# Patient Record
Sex: FEMALE | Race: WHITE | NOT HISPANIC OR LATINO | Employment: FULL TIME | URBAN - METROPOLITAN AREA
[De-identification: names, ages, dates, MRNs, and addresses within clinical notes are randomized per-mention and may not be internally consistent; named-entity substitution may affect disease eponyms.]

---

## 2021-03-08 RX ORDER — FLASH GLUCOSE SENSOR
KIT MISCELLANEOUS
Qty: 2 EACH | Refills: 0 | Status: SHIPPED | OUTPATIENT
Start: 2021-03-08 | End: 2021-04-08 | Stop reason: SDUPTHER

## 2021-04-08 RX ORDER — FLASH GLUCOSE SENSOR
KIT MISCELLANEOUS
Qty: 2 EACH | Refills: 1 | Status: SHIPPED | OUTPATIENT
Start: 2021-04-08 | End: 2021-05-21

## 2021-04-08 NOTE — TELEPHONE ENCOUNTER
PT CALLED REQUESTING A REFILL OF AA PartyE SENSORS TO BE SENT IN TO [facility] IN [place], [place]. PLEASE AND THANK YOU

## 2021-05-12 RX ORDER — INSULIN DEGLUDEC 200 U/ML
INJECTION, SOLUTION SUBCUTANEOUS
Qty: 18 ML | Refills: 0 | Status: SHIPPED | OUTPATIENT
Start: 2021-05-12 | End: 2021-06-04 | Stop reason: SDUPTHER

## 2021-05-21 ENCOUNTER — LAB (OUTPATIENT)
Dept: LAB | Facility: HOSPITAL | Age: 41
End: 2021-05-21

## 2021-05-21 ENCOUNTER — OFFICE VISIT (OUTPATIENT)
Dept: ENDOCRINOLOGY | Facility: CLINIC | Age: 41
End: 2021-05-21

## 2021-05-21 VITALS
OXYGEN SATURATION: 98 % | HEART RATE: 76 BPM | BODY MASS INDEX: 43.71 KG/M2 | WEIGHT: 256 LBS | HEIGHT: 64 IN | SYSTOLIC BLOOD PRESSURE: 118 MMHG | DIASTOLIC BLOOD PRESSURE: 84 MMHG

## 2021-05-21 DIAGNOSIS — E10.65 TYPE 1 DIABETES MELLITUS WITH HYPERGLYCEMIA (HCC): Primary | Chronic | ICD-10-CM

## 2021-05-21 DIAGNOSIS — E10.65 TYPE 1 DIABETES MELLITUS WITH HYPERGLYCEMIA (HCC): Chronic | ICD-10-CM

## 2021-05-21 LAB
EXPIRATION DATE: ABNORMAL
EXPIRATION DATE: NORMAL
GLUCOSE BLDC GLUCOMTR-MCNC: 162 MG/DL (ref 70–130)
HBA1C MFR BLD: 7 %
Lab: ABNORMAL
Lab: NORMAL

## 2021-05-21 PROCEDURE — 83036 HEMOGLOBIN GLYCOSYLATED A1C: CPT | Performed by: PHYSICIAN ASSISTANT

## 2021-05-21 PROCEDURE — 82570 ASSAY OF URINE CREATININE: CPT

## 2021-05-21 PROCEDURE — 99213 OFFICE O/P EST LOW 20 MIN: CPT | Performed by: PHYSICIAN ASSISTANT

## 2021-05-21 PROCEDURE — 95251 CONT GLUC MNTR ANALYSIS I&R: CPT | Performed by: PHYSICIAN ASSISTANT

## 2021-05-21 PROCEDURE — 82043 UR ALBUMIN QUANTITATIVE: CPT

## 2021-05-21 RX ORDER — LISINOPRIL AND HYDROCHLOROTHIAZIDE 12.5; 1 MG/1; MG/1
1 TABLET ORAL DAILY
COMMUNITY
End: 2022-03-16 | Stop reason: SDUPTHER

## 2021-05-21 RX ORDER — VILAZODONE HYDROCHLORIDE 20 MG/1
20 TABLET ORAL DAILY
COMMUNITY
End: 2022-03-15 | Stop reason: SDUPTHER

## 2021-05-21 RX ORDER — ATORVASTATIN CALCIUM 20 MG/1
20 TABLET, FILM COATED ORAL DAILY
COMMUNITY
End: 2022-03-16 | Stop reason: SDUPTHER

## 2021-05-21 NOTE — PROGRESS NOTES
"     Office Note      Date: 2021  Patient Name: Bridgett Barber  MRN: [id number]  : 1980    Chief Complaint   Patient presents with   • Diabetes       History of Present Illness:   Bridgett Barber is a 40 y.o. female who presents today for type 1 diabetes, diagnosed .  Her last visit was over a year ago.  She remains on basal/bolus insulin.  Currently taking 80 units of Tresiba.  She is taking Novolog based on carb counting.  She does add insulin if blood sugar is higher before eating, but she is not really using a correction factor.  She is using the Michelle CGM.  She reports seeing PCP in 2020 and had labs.  The A1c was over 9%.  She reports working on blood sugar control and diet after this.  She notes some hypoglycemia.  She denies any severe hypoglycemia.  She reports hands feel numb when she has woken up at night with hypoglycemia.  She denies any problems with her feet.  Eye exam up to date.  She remains on statin and ACE inhibitor.    Subjective      Review of Systems:   Review of Systems   Constitutional: Negative.    Cardiovascular: Negative.    Gastrointestinal: Negative.    Endocrine: Negative.        The following portions of the patient's history were reviewed and updated as appropriate: allergies, current medications, past family history, past medical history, past social history, past surgical history and problem list.    Objective     Vitals:    21 1037   BP: 118/84   BP Location: Left arm   Patient Position: Sitting   Cuff Size: Adult   Pulse: 76   SpO2: 98%   Weight: 116 kg (256 lb)   Height: 162.6 cm (64\")   PainSc: 0-No pain     Body mass index is 43.94 kg/m².    Physical Exam  Vitals reviewed.   Constitutional:       General: She is not in acute distress.  Cardiovascular:      Pulses:           Dorsalis pedis pulses are 2+ on the right side and 2+ on the left side.        Posterior tibial pulses are 2+ on the right side and 2+ on the left side.   Musculoskeletal:      Right foot: " No deformity.      Left foot: No deformity.   Feet:      Right foot:      Protective Sensation: 10 sites tested. 10 sites sensed.      Skin integrity: Skin integrity normal.      Toenail Condition: Right toenails are normal.      Left foot:      Protective Sensation: 10 sites tested. 10 sites sensed.      Skin integrity: Skin integrity normal.      Toenail Condition: Left toenails are normal.      Comments:   Neurological:      Mental Status: She is alert and oriented to person, place, and time.   Psychiatric:         Mood and Affect: Affect normal.         HEMOGLOBIN A1C  Lab Results   Component Value Date    HGBA1C 7.0 05/21/2021         Current Outpatient Medications   Medication Instructions   • atorvastatin (LIPITOR) 20 mg, Oral, Daily   • Continuous Blood Gluc  (FreeStyle Michelle 2 [place]) device 1 Device, Does not apply, Continuous, Use as directed for continuously monitoring glucose.   • Continuous Blood Gluc Sensor (FreeStyle Michelle 2 Sensor) misc 1 Device, Does not apply, Every 14 Days   • insulin aspart (novoLOG FLEXPEN) 100 UNIT/ML solution pen-injector sc pen Inject 1 unit per 3g carbs with meals, max 100 units daily   • Insulin Degludec (Tresiba FlexTouch) 200 UNIT/ML solution pen-injector pen injection Inject 96u sq q AM; titrate up to fasting FSBS <120; max daily dose 120u   • lisinopril-hydrochlorothiazide (PRINZIDE,ZESTORETIC) 10-12.5 MG per tablet 1 tablet, Oral, Daily   • vilazodone (VIIBRYD) 20 mg, Oral, Daily       Assessment / Plan      Assessment & Plan:  1. Type 1 diabetes mellitus with hyperglycemia (CMS/[facility])  A1c much better!  Good job.  Reviewed CGM data and discussed with patient.  Sensor glucose average 150 for the past 2 weeks, 68% time in target, 5% low.  Basal dose looks okay.  No patterns to make adjustments at this time.  Make sure to be scanning at least every 8 hours to avoid breaks in data.  Discussed changing to the Michelle 2 CGM for alerts - new Rx sent.  Discussed how she  is currently dosing for hyperglycemia.  Will use correction factor 1 unit per 10 over 150 - adjust from there if this is too aggressive.  - POC Glycosylated Hemoglobin (Hb A1C)  - POC Glucose, Blood  - Microalbumin / Creatinine Urine Ratio - Urine, Clean Catch; Future      Return in about 3 months (around 8/21/2021) for Next scheduled follow up.     EHSNA Lisa  Endocrinology  05/21/2021

## 2021-05-22 LAB
ALBUMIN UR-MCNC: <1.2 MG/DL
CREAT UR-MCNC: 110 MG/DL
MICROALBUMIN/CREAT UR: NORMAL MG/G{CREAT}

## 2021-05-25 ENCOUNTER — TELEPHONE (OUTPATIENT)
Dept: ENDOCRINOLOGY | Facility: CLINIC | Age: 41
End: 2021-05-25

## 2021-05-25 DIAGNOSIS — E10.65 TYPE 1 DIABETES MELLITUS WITH HYPERGLYCEMIA (HCC): Primary | ICD-10-CM

## 2021-05-25 NOTE — TELEPHONE ENCOUNTER
I received fax from her insurance.  Stated that the Freestyle Michelle CGM is not covered.  Her insurance prefers the Dexcom CGM.  Please ask patient if she wants to use the Dexcom?  Thank you.

## 2021-05-26 RX ORDER — PROCHLORPERAZINE 25 MG/1
1 SUPPOSITORY RECTAL
Qty: 1 EACH | Refills: 3 | Status: SHIPPED | OUTPATIENT
Start: 2021-05-26 | End: 2022-03-16 | Stop reason: SDUPTHER

## 2021-05-26 RX ORDER — PROCHLORPERAZINE 25 MG/1
1 SUPPOSITORY RECTAL CONTINUOUS
Qty: 1 EACH | Refills: 0 | Status: SHIPPED | OUTPATIENT
Start: 2021-05-26 | End: 2022-03-16

## 2021-05-26 RX ORDER — PROCHLORPERAZINE 25 MG/1
SUPPOSITORY RECTAL
Qty: 9 EACH | Refills: 3 | Status: SHIPPED | OUTPATIENT
Start: 2021-05-26 | End: 2022-03-16 | Stop reason: SDUPTHER

## 2021-05-26 NOTE — TELEPHONE ENCOUNTER
Tried to call patient and the number listed is disconnected.  Tried to call emergency contact and there was no answer.  Left message to return call.

## 2021-06-04 RX ORDER — INSULIN DEGLUDEC 200 U/ML
INJECTION, SOLUTION SUBCUTANEOUS
Qty: 54 ML | Refills: 1 | Status: SHIPPED | OUTPATIENT
Start: 2021-06-04 | End: 2022-03-16

## 2021-06-04 NOTE — TELEPHONE ENCOUNTER
Pt called needs refill on Tresiba flextouch 100 unit also Novolog flextouch 100 unit. Pt last seen 05/21/21 pt next appt 09/03/21

## 2021-10-18 ENCOUNTER — PATIENT MESSAGE (OUTPATIENT)
Dept: ENDOCRINOLOGY | Facility: CLINIC | Age: 41
End: 2021-10-18

## 2022-03-15 ENCOUNTER — LAB (OUTPATIENT)
Dept: LAB | Facility: HOSPITAL | Age: 42
End: 2022-03-15

## 2022-03-15 ENCOUNTER — OFFICE VISIT (OUTPATIENT)
Dept: ENDOCRINOLOGY | Facility: CLINIC | Age: 42
End: 2022-03-15

## 2022-03-15 VITALS
HEIGHT: 65 IN | OXYGEN SATURATION: 99 % | SYSTOLIC BLOOD PRESSURE: 116 MMHG | DIASTOLIC BLOOD PRESSURE: 70 MMHG | HEART RATE: 76 BPM | WEIGHT: 253 LBS | BODY MASS INDEX: 42.15 KG/M2

## 2022-03-15 DIAGNOSIS — E78.00 HYPERCHOLESTEROLEMIA: Chronic | ICD-10-CM

## 2022-03-15 DIAGNOSIS — I10 PRIMARY HYPERTENSION: Chronic | ICD-10-CM

## 2022-03-15 DIAGNOSIS — E78.00 HYPERCHOLESTEROLEMIA: ICD-10-CM

## 2022-03-15 DIAGNOSIS — E10.65 TYPE 1 DIABETES MELLITUS WITH HYPERGLYCEMIA: ICD-10-CM

## 2022-03-15 DIAGNOSIS — E10.65 TYPE 1 DIABETES MELLITUS WITH HYPERGLYCEMIA: Primary | Chronic | ICD-10-CM

## 2022-03-15 LAB
ALBUMIN SERPL-MCNC: 4.5 G/DL (ref 3.5–5.2)
ALBUMIN/GLOB SERPL: 1.7 G/DL
ALP SERPL-CCNC: 59 U/L (ref 39–117)
ALT SERPL W P-5'-P-CCNC: 25 U/L (ref 1–33)
ANION GAP SERPL CALCULATED.3IONS-SCNC: 10.9 MMOL/L (ref 5–15)
AST SERPL-CCNC: 15 U/L (ref 1–32)
BILIRUB SERPL-MCNC: 0.4 MG/DL (ref 0–1.2)
BUN SERPL-MCNC: 13 MG/DL (ref 6–20)
BUN/CREAT SERPL: 20 (ref 7–25)
CALCIUM SPEC-SCNC: 9.7 MG/DL (ref 8.6–10.5)
CHLORIDE SERPL-SCNC: 102 MMOL/L (ref 98–107)
CHOLEST SERPL-MCNC: 241 MG/DL (ref 0–200)
CO2 SERPL-SCNC: 26.1 MMOL/L (ref 22–29)
CREAT SERPL-MCNC: 0.65 MG/DL (ref 0.57–1)
DEPRECATED RDW RBC AUTO: 41.5 FL (ref 37–54)
EGFRCR SERPLBLD CKD-EPI 2021: 112.9 ML/MIN/1.73
ERYTHROCYTE [DISTWIDTH] IN BLOOD BY AUTOMATED COUNT: 12.2 % (ref 12.3–15.4)
EXPIRATION DATE: ABNORMAL
EXPIRATION DATE: NORMAL
GLOBULIN UR ELPH-MCNC: 2.7 GM/DL
GLUCOSE BLDC GLUCOMTR-MCNC: 194 MG/DL (ref 70–130)
GLUCOSE SERPL-MCNC: 193 MG/DL (ref 65–99)
HBA1C MFR BLD: 6.3 %
HCT VFR BLD AUTO: 42 % (ref 34–46.6)
HDLC SERPL-MCNC: 47 MG/DL (ref 40–60)
HGB BLD-MCNC: 14.1 G/DL (ref 12–15.9)
LDLC SERPL CALC-MCNC: 181 MG/DL (ref 0–100)
LDLC/HDLC SERPL: 3.8 {RATIO}
Lab: ABNORMAL
Lab: NORMAL
MCH RBC QN AUTO: 31.2 PG (ref 26.6–33)
MCHC RBC AUTO-ENTMCNC: 33.6 G/DL (ref 31.5–35.7)
MCV RBC AUTO: 92.9 FL (ref 79–97)
PLATELET # BLD AUTO: 237 10*3/MM3 (ref 140–450)
PMV BLD AUTO: 10 FL (ref 6–12)
POTASSIUM SERPL-SCNC: 4.5 MMOL/L (ref 3.5–5.2)
PROT SERPL-MCNC: 7.2 G/DL (ref 6–8.5)
RBC # BLD AUTO: 4.52 10*6/MM3 (ref 3.77–5.28)
SODIUM SERPL-SCNC: 139 MMOL/L (ref 136–145)
TRIGL SERPL-MCNC: 78 MG/DL (ref 0–150)
TSH SERPL DL<=0.05 MIU/L-ACNC: 3.31 UIU/ML (ref 0.27–4.2)
VLDLC SERPL-MCNC: 13 MG/DL (ref 5–40)
WBC NRBC COR # BLD: 8.44 10*3/MM3 (ref 3.4–10.8)

## 2022-03-15 PROCEDURE — 80061 LIPID PANEL: CPT

## 2022-03-15 PROCEDURE — 82043 UR ALBUMIN QUANTITATIVE: CPT

## 2022-03-15 PROCEDURE — 95251 CONT GLUC MNTR ANALYSIS I&R: CPT | Performed by: PHYSICIAN ASSISTANT

## 2022-03-15 PROCEDURE — 99214 OFFICE O/P EST MOD 30 MIN: CPT | Performed by: PHYSICIAN ASSISTANT

## 2022-03-15 PROCEDURE — 82570 ASSAY OF URINE CREATININE: CPT

## 2022-03-15 PROCEDURE — 80050 GENERAL HEALTH PANEL: CPT

## 2022-03-15 PROCEDURE — 83036 HEMOGLOBIN GLYCOSYLATED A1C: CPT | Performed by: PHYSICIAN ASSISTANT

## 2022-03-15 RX ORDER — VILAZODONE HYDROCHLORIDE 20 MG/1
20 TABLET ORAL DAILY
Qty: 30 TABLET | Refills: 0 | Status: SHIPPED | OUTPATIENT
Start: 2022-03-15 | End: 2022-11-28

## 2022-03-16 LAB
ALBUMIN UR-MCNC: <1.2 MG/DL
CREAT UR-MCNC: 133.9 MG/DL
MICROALBUMIN/CREAT UR: NORMAL MG/G{CREAT}

## 2022-03-16 RX ORDER — PROCHLORPERAZINE 25 MG/1
1 SUPPOSITORY RECTAL
Qty: 1 EACH | Refills: 3 | Status: SHIPPED | OUTPATIENT
Start: 2022-03-16 | End: 2023-04-04 | Stop reason: SDUPTHER

## 2022-03-16 RX ORDER — INSULIN DEGLUDEC 200 U/ML
INJECTION, SOLUTION SUBCUTANEOUS
Qty: 45 ML | Refills: 1 | Status: SHIPPED | OUTPATIENT
Start: 2022-03-16 | End: 2022-12-01

## 2022-03-16 RX ORDER — ATORVASTATIN CALCIUM 20 MG/1
20 TABLET, FILM COATED ORAL DAILY
Qty: 90 TABLET | Refills: 3 | Status: SHIPPED | OUTPATIENT
Start: 2022-03-16

## 2022-03-16 RX ORDER — PROCHLORPERAZINE 25 MG/1
SUPPOSITORY RECTAL
Qty: 9 EACH | Refills: 3 | Status: SHIPPED | OUTPATIENT
Start: 2022-03-16 | End: 2022-05-09

## 2022-03-16 RX ORDER — LISINOPRIL AND HYDROCHLOROTHIAZIDE 12.5; 1 MG/1; MG/1
1 TABLET ORAL DAILY
Qty: 90 TABLET | Refills: 3 | Status: SHIPPED | OUTPATIENT
Start: 2022-03-16 | End: 2022-11-28

## 2022-03-23 ENCOUNTER — PATIENT MESSAGE (OUTPATIENT)
Dept: ENDOCRINOLOGY | Facility: CLINIC | Age: 42
End: 2022-03-23

## 2022-05-09 DIAGNOSIS — E10.65 TYPE 1 DIABETES MELLITUS WITH HYPERGLYCEMIA: Chronic | ICD-10-CM

## 2022-05-09 RX ORDER — PROCHLORPERAZINE 25 MG/1
SUPPOSITORY RECTAL
Qty: 9 EACH | Refills: 3 | Status: SHIPPED | OUTPATIENT
Start: 2022-05-09 | End: 2023-04-04 | Stop reason: SDUPTHER

## 2022-05-27 DIAGNOSIS — E10.65 TYPE 1 DIABETES MELLITUS WITH HYPERGLYCEMIA: Chronic | ICD-10-CM

## 2022-05-27 RX ORDER — PROCHLORPERAZINE 25 MG/1
SUPPOSITORY RECTAL
Qty: 1 EACH | Refills: 3 | OUTPATIENT
Start: 2022-05-27

## 2022-05-27 NOTE — TELEPHONE ENCOUNTER
Duplicate request RX was sent in March with 3 refills  Not due yet   Continuous Blood Gluc Transmit (Dexcom G6 Transmitter) misc [[id number]]     Order Details  Dose: 1 Device Route: Does not apply Frequency: Every 3 Months   Dispense Quantity: 1 each Refills: 3          Si Device Every 3 (Three) Months.         Start Date: 22 End Date: --   Written Date: 22 Expiration Date: 23       Diagnosis Association: Type 1 diabetes mellitus with hyperglycemia (HCC) (E10.65)   Original Order:  Continuous Blood Gluc Transmit (Dexcom G6 Transmitter) misc [[id number]]     Providers    Ordering Provider and Authorizing Provider:    Sweta Gilmore PA   [address] [place] [address]   Phone:  [phone]   Fax:  [phone]   NPI:  [id number]        Ordering User:  Sweta Gilmore PA          Pharmacy    [facility] [address] AT [address] [facility] - [phone] [facility] [phone]    [id number]  [facility] [address]   Phone:  [phone]  Fax:  [phone]           Orders with any of the following pharmaceutical classes: Medical Devices    Name Dose Frequency Start Date End Date Medication Warnings Interventions? Order Mode    Continuous Blood Gluc Sensor (Dexcom G6 Sensor)   22    Outpatient                        Warnings History    No Interaction Warnings Shown              Pharmacist Clinical Review History    This prescription has not been clinically reviewed.       Order Reconciliation Actions       Order Reconciliation Actions                 E-Prescribing Status      Outpatient Medication Detail    Continuous Blood Gluc Transmit (Dexcom G6 Transmitter) misc        Si Device Every 3 (Three) Months.        Sent to pharmacy as: Dexcom G6 Transmitter        Class: Normal        Route: Does not apply        E-Prescribing Status: Receipt confirmed by pharmacy (3/16/2022  7:49 PM EDT)

## 2022-11-08 DIAGNOSIS — E10.65 TYPE 1 DIABETES MELLITUS WITH HYPERGLYCEMIA: Chronic | ICD-10-CM

## 2022-11-08 RX ORDER — INSULIN ASPART 100 [IU]/ML
INJECTION, SOLUTION INTRAVENOUS; SUBCUTANEOUS
Qty: 90 ML | Refills: 1 | OUTPATIENT
Start: 2022-11-08

## 2022-11-08 NOTE — TELEPHONE ENCOUNTER
Last office visit 03/15/2022.  No follow up scheduled.    Spoke with patient and appointment scheduled for 11/30/2022 at 1000am.  Patient states she does not need refill at this time.

## 2022-11-28 ENCOUNTER — OFFICE VISIT (OUTPATIENT)
Dept: ENDOCRINOLOGY | Facility: CLINIC | Age: 42
End: 2022-11-28
Payer: COMMERCIAL

## 2022-11-28 ENCOUNTER — LAB (OUTPATIENT)
Dept: LAB | Facility: HOSPITAL | Age: 42
End: 2022-11-28

## 2022-11-28 ENCOUNTER — TELEPHONE (OUTPATIENT)
Dept: ENDOCRINOLOGY | Facility: CLINIC | Age: 42
End: 2022-11-28

## 2022-11-28 VITALS
DIASTOLIC BLOOD PRESSURE: 72 MMHG | OXYGEN SATURATION: 98 % | HEIGHT: 65 IN | WEIGHT: 272 LBS | BODY MASS INDEX: 45.32 KG/M2 | HEART RATE: 78 BPM | SYSTOLIC BLOOD PRESSURE: 122 MMHG

## 2022-11-28 DIAGNOSIS — R60.9 EDEMA, UNSPECIFIED TYPE: ICD-10-CM

## 2022-11-28 DIAGNOSIS — E10.65 TYPE 1 DIABETES MELLITUS WITH HYPERGLYCEMIA: Primary | Chronic | ICD-10-CM

## 2022-11-28 DIAGNOSIS — E78.00 HYPERCHOLESTEROLEMIA: Chronic | ICD-10-CM

## 2022-11-28 DIAGNOSIS — E66.01 CLASS 3 SEVERE OBESITY DUE TO EXCESS CALORIES WITH BODY MASS INDEX (BMI) OF 40.0 TO 44.9 IN ADULT, UNSPECIFIED WHETHER SERIOUS COMORBIDITY PRESENT: Chronic | ICD-10-CM

## 2022-11-28 LAB
CREAT UR-MCNC: 128.4 MG/DL
EXPIRATION DATE: ABNORMAL
GLUCOSE BLDC GLUCOMTR-MCNC: 139 MG/DL (ref 70–130)
Lab: ABNORMAL
PROT ?TM UR-MCNC: 5.5 MG/DL
PROT/CREAT UR: 42.8 MG/G CREA (ref 0–200)

## 2022-11-28 PROCEDURE — 82570 ASSAY OF URINE CREATININE: CPT

## 2022-11-28 PROCEDURE — 95251 CONT GLUC MNTR ANALYSIS I&R: CPT | Performed by: PHYSICIAN ASSISTANT

## 2022-11-28 PROCEDURE — 99214 OFFICE O/P EST MOD 30 MIN: CPT | Performed by: PHYSICIAN ASSISTANT

## 2022-11-28 PROCEDURE — 84156 ASSAY OF PROTEIN URINE: CPT

## 2022-11-28 RX ORDER — SEMAGLUTIDE 1.34 MG/ML
INJECTION, SOLUTION SUBCUTANEOUS
Qty: 1.5 ML | Refills: 0 | Status: SHIPPED | OUTPATIENT
Start: 2022-11-28 | End: 2023-04-04 | Stop reason: ALTCHOICE

## 2022-11-28 NOTE — TELEPHONE ENCOUNTER
----- Message from EHSAN Hamilton sent at 11/28/2022 12:01 PM EST -----  Please call for copy of lab results with PCP from 11/17/2022 - Barbara CARNEY.  Thank you.

## 2022-12-01 DIAGNOSIS — E10.65 TYPE 1 DIABETES MELLITUS WITH HYPERGLYCEMIA: Chronic | ICD-10-CM

## 2022-12-01 RX ORDER — INSULIN DEGLUDEC 200 U/ML
INJECTION, SOLUTION SUBCUTANEOUS
Qty: 45 ML | Refills: 1 | Status: SHIPPED | OUTPATIENT
Start: 2022-12-01

## 2022-12-29 DIAGNOSIS — E10.65 TYPE 1 DIABETES MELLITUS WITH HYPERGLYCEMIA: Chronic | ICD-10-CM

## 2022-12-29 RX ORDER — INSULIN ASPART 100 [IU]/ML
INJECTION, SOLUTION INTRAVENOUS; SUBCUTANEOUS
Qty: 30 ML | Refills: 1 | Status: SHIPPED | OUTPATIENT
Start: 2022-12-29 | End: 2023-01-11 | Stop reason: SDUPTHER

## 2022-12-29 NOTE — TELEPHONE ENCOUNTER
PT CALLED REQUESTING NOVOLOG TO BE SENT IN TO [facility].    PT ALSO WOULD LIKE A TEMP RX TO BE SENT IN TO [facility] IN [place], [place].

## 2023-01-11 ENCOUNTER — PATIENT MESSAGE (OUTPATIENT)
Dept: ENDOCRINOLOGY | Facility: CLINIC | Age: 43
End: 2023-01-11
Payer: COMMERCIAL

## 2023-01-11 RX ORDER — INSULIN ASPART 100 [IU]/ML
INJECTION, SOLUTION INTRAVENOUS; SUBCUTANEOUS
Qty: 30 ML | Refills: 1 | Status: SHIPPED | OUTPATIENT
Start: 2023-01-11 | End: 2023-04-04 | Stop reason: ALTCHOICE

## 2023-01-11 RX ORDER — INSULIN ASPART 100 [IU]/ML
INJECTION, SOLUTION INTRAVENOUS; SUBCUTANEOUS
Qty: 30 ML | Refills: 1 | OUTPATIENT
Start: 2023-01-11

## 2023-01-11 NOTE — TELEPHONE ENCOUNTER
From: Bridgett Barber  To: Sweta Gilmore  Sent: 1/11/2023 3:35 PM EST  Subject: Novolog    I have a prescription for Novolog that will be shipped from [facility] next week. I picked up a one box supply from [facility] on the 12-29-22. I only have a little left from this box and I am afraid that it won't last until that shipment comes in.   I haven't been feeling well the past couple of weeks, I have had a cold and some stomach issues. My sugar has been running high and I am assuming it is due to me not feeling well, so I have been having to take more insulin than normal. I have never ran out of insulin before that I recall but I am worried that this might happen. So I didn't know if you could you fix it where I could get it refilled at [facility]?

## 2023-04-04 ENCOUNTER — OFFICE VISIT (OUTPATIENT)
Dept: ENDOCRINOLOGY | Facility: CLINIC | Age: 43
End: 2023-04-04
Payer: COMMERCIAL

## 2023-04-04 VITALS
HEART RATE: 78 BPM | WEIGHT: 265 LBS | BODY MASS INDEX: 44.15 KG/M2 | OXYGEN SATURATION: 99 % | HEIGHT: 65 IN | DIASTOLIC BLOOD PRESSURE: 82 MMHG | SYSTOLIC BLOOD PRESSURE: 120 MMHG

## 2023-04-04 DIAGNOSIS — E78.00 HYPERCHOLESTEROLEMIA: Chronic | ICD-10-CM

## 2023-04-04 DIAGNOSIS — E66.01 CLASS 3 SEVERE OBESITY DUE TO EXCESS CALORIES WITH BODY MASS INDEX (BMI) OF 40.0 TO 44.9 IN ADULT, UNSPECIFIED WHETHER SERIOUS COMORBIDITY PRESENT: Chronic | ICD-10-CM

## 2023-04-04 DIAGNOSIS — E10.65 TYPE 1 DIABETES MELLITUS WITH HYPERGLYCEMIA: Primary | Chronic | ICD-10-CM

## 2023-04-04 LAB
EXPIRATION DATE: ABNORMAL
EXPIRATION DATE: NORMAL
GLUCOSE BLDC GLUCOMTR-MCNC: 199 MG/DL (ref 70–130)
HBA1C MFR BLD: 5.7 %
Lab: ABNORMAL
Lab: NORMAL

## 2023-04-04 RX ORDER — LIRAGLUTIDE 6 MG/ML
INJECTION, SOLUTION SUBCUTANEOUS
COMMUNITY
Start: 2023-03-31

## 2023-04-04 RX ORDER — PROCHLORPERAZINE 25 MG/1
1 SUPPOSITORY RECTAL
Qty: 1 EACH | Refills: 3 | Status: SHIPPED | OUTPATIENT
Start: 2023-04-04

## 2023-04-04 RX ORDER — PROCHLORPERAZINE 25 MG/1
SUPPOSITORY RECTAL
Qty: 9 EACH | Refills: 3 | Status: SHIPPED | OUTPATIENT
Start: 2023-04-04

## 2023-04-04 NOTE — PROGRESS NOTES
Office Note      Date: 2023  Patient Name: Bridgett Barber  MRN: [id number]  : 1980    Chief Complaint   Patient presents with   • Diabetes       History of Present Illness  Bridgett Barber is a 42 y.o. female who presents today for follow up on type 1 diabetes.   Diabetes was diagnosed in .  Known diabetic complications: none.  She remains on basal/bolus insulin injections.  She is using the Dexcom G6 CGM.  She is monitoring glucose ~288 times per day using CGM.  She is frequently adjusting insulin based on glucose readings and carb counting.  She reports that Dexcom has often been higher than FSBG.  She has been calibrating as needed.  She reports higher BGs in the past couple of weeks.  She has noted some hypoglycemia.  She reports that this has occurred after pre-bolusing for meal and then not eating as much as planned.  No severe hypoglycemia.  Diet/exercise: She has cut back on portions.  Stationary bike at home, 15-30 minutes per day.  Feet: No issues.  Last eye exam: 2022 (may have been more recent - she will check on this). No retinopathy.  ACE inhibitor/ARB: Lisinopril was discontinued due to hypotension.  Statin: Atorvastatin.  Ozempic was denied since type 1 diabetes.  Wegovy was on backorder.  She reports being prescribed Saxenda by her PCP.  She has been on Saxenda for 2 months.  She reports tolerating well now after initial N/V/D.    Review of systems  As noted in HPI.    Past Medical History:   Diagnosis Date   • Hypercholesterolemia    • Hypertension    • Obesity    • Type 1 diabetes mellitus, uncontrolled       Past Surgical History:   Procedure Laterality Date   • CHOLECYSTECTOMY     • LAPAROSCOPIC TUBAL LIGATION     • LASER ABLATION OF THE CERVIX     • TONSILLECTOMY AND ADENOIDECTOMY       The following portions of the patient's history were reviewed and updated as appropriate: allergies, current medications, past family history, past medical history, past social history, past  "surgical history and problem list.    Vitals:  /82 (BP Location: Left arm, Patient Position: Sitting, Cuff Size: Adult)   Pulse 78   Ht 165.1 cm (65\")   Wt 120 kg (265 lb)   SpO2 99%   BMI 44.10 kg/m²     Physical Exam  Vitals reviewed.   Constitutional:       General: She is not in acute distress.  Neurological:      Mental Status: She is alert and oriented to person, place, and time.   Psychiatric:         Mood and Affect: Affect normal.         Labs/Imaging    Hemoglobin A1c  Lab Results   Component Value Date    HGBA1C 5.7 04/04/2023    HGBA1C 6.3 03/15/2022    HGBA1C 7.0 05/21/2021     Glucose  Lab Results   Component Value Date    POCGLU 199 (A) 04/04/2023     Urine microalbumin/creatinine ratio  Lab Results   Component Value Date    UTPCR 42.8 11/28/2022     Lab Results   Component Value Date    MALBCRERATIO  03/15/2022      Comment:      Unable to calculate       Current Outpatient Medications   Medication Instructions   • atorvastatin (LIPITOR) 20 mg, Oral, Daily   • Continuous Blood Gluc Sensor (Dexcom G6 Sensor) Change every 10 days.   • Continuous Blood Gluc Transmit (Dexcom G6 Transmitter) misc 1 Device, Does not apply, Every 3 Months   • cycloSPORINE, PF, 0.09 % solution Ophthalmic   • insulin aspart (novoLOG FLEXPEN) 100 UNIT/ML solution pen-injector sc pen Subcutaneous, 3 Times Daily With Meals, 1 unit per q 3 carbs   • Insulin Degludec (Tresiba FlexTouch) 200 UNIT/ML solution pen-injector pen injection INJECT 80 UNITS SUBCUTANEOUSLY EVERY MORNING; TITRATE UP TO FASTING FINGERSTICK BLOOD SUGAR LESS THAN 120; (MAXIMUM DAILY DOSE: 100 UNITS)   • Saxenda 18 MG/3ML injection pen No dose, route, or frequency recorded.       Assessment & Plan  1. Type 1 diabetes mellitus with hyperglycemia  A1c looks good.  Would expect higher A1c based on Dexcom CGM data.  Reviewed CGM data and discussed with patient.  Sensor glucose average 156 for the past 2 weeks, 71% time in range , 1% low 54-69, <1% " very low <54, 5% high >250.  Rising glucose/fasting hyperglycemia.  Some of this appears to be rebound from hypoglycemia.  Tresiba 80 units daily.  Novolog carb ratio 1:3, correction 1:15 over 130.  Discussed adjusting boluses up to 50% prior to physical activity/exercise.  She will have labs with PCP next week.  Check fructosamine due to discrepancy between A1c and CGM.  Lab order provided today.  - POC Glucose, Blood  - POC Glycosylated Hemoglobin (Hb A1C)  - Fructosamine; Future  - Continuous Blood Gluc Transmit (Dexcom G6 Transmitter) misc; 1 Device Every 3 (Three) Months.  Dispense: 1 each; Refill: 3  - Continuous Blood Gluc Sensor (Dexcom G6 Sensor); Change every 10 days.  Dispense: 9 each; Refill: 3    2. Hypercholesterolemia  Continue atorvastatin along with diet/exercise.    3. Class 3 severe obesity due to excess calories with body mass index (BMI) of 40.0 to 44.9 in adult, unspecified whether serious comorbidity present  Weight is down 7 pounds.  She will continue the Saxenda.  Encouraged nutritious dietary choices, moderation of carbohydrates, avoidance of added sugar, caloric restriction and regular exercise.       Return in about 3 months (around 7/4/2023) for next scheduled follow up. She was advised to contact the office with any interval questions or concerns.    EHSAN Lisa  Endocrinology  04/04/2023

## 2023-04-17 DIAGNOSIS — E10.65 TYPE 1 DIABETES MELLITUS WITH HYPERGLYCEMIA: Chronic | ICD-10-CM

## 2023-05-14 DIAGNOSIS — E10.65 TYPE 1 DIABETES MELLITUS WITH HYPERGLYCEMIA: Chronic | ICD-10-CM

## 2023-05-15 ENCOUNTER — PATIENT MESSAGE (OUTPATIENT)
Dept: ENDOCRINOLOGY | Facility: CLINIC | Age: 43
End: 2023-05-15
Payer: COMMERCIAL

## 2023-05-15 DIAGNOSIS — E10.65 TYPE 1 DIABETES MELLITUS WITH HYPERGLYCEMIA: Primary | ICD-10-CM

## 2023-05-16 RX ORDER — INSULIN DEGLUDEC 200 U/ML
INJECTION, SOLUTION SUBCUTANEOUS
Qty: 45 ML | Refills: 1 | Status: SHIPPED | OUTPATIENT
Start: 2023-05-16

## 2023-05-17 RX ORDER — BLOOD-GLUCOSE SENSOR
1 EACH MISCELLANEOUS
Qty: 9 EACH | Refills: 3 | Status: SHIPPED | OUTPATIENT
Start: 2023-05-17

## 2023-06-01 ENCOUNTER — TRANSCRIBE ORDERS (OUTPATIENT)
Dept: LAB | Facility: HOSPITAL | Age: 43
End: 2023-06-01
Payer: COMMERCIAL

## 2023-06-01 ENCOUNTER — LAB (OUTPATIENT)
Dept: LAB | Facility: HOSPITAL | Age: 43
End: 2023-06-01
Payer: COMMERCIAL

## 2023-06-01 DIAGNOSIS — N95.1 SYMPTOMATIC MENOPAUSAL OR FEMALE CLIMACTERIC STATES: Primary | ICD-10-CM

## 2023-06-01 DIAGNOSIS — N95.1 SYMPTOMATIC MENOPAUSAL OR FEMALE CLIMACTERIC STATES: ICD-10-CM

## 2023-06-01 LAB
ESTRADIOL SERPL HS-MCNC: 323 PG/ML
FSH SERPL-ACNC: 11.8 MIU/ML
LH SERPL-ACNC: 32 MIU/ML
PROGEST SERPL-MCNC: 0.28 NG/ML
TESTOST SERPL-MCNC: 62.2 NG/DL (ref 8.4–48.1)

## 2023-06-01 PROCEDURE — 36415 COLL VENOUS BLD VENIPUNCTURE: CPT | Performed by: NURSE PRACTITIONER

## 2023-06-01 PROCEDURE — 82670 ASSAY OF TOTAL ESTRADIOL: CPT | Performed by: NURSE PRACTITIONER

## 2023-06-01 PROCEDURE — 83001 ASSAY OF GONADOTROPIN (FSH): CPT

## 2023-06-01 PROCEDURE — 84403 ASSAY OF TOTAL TESTOSTERONE: CPT

## 2023-06-01 PROCEDURE — 84144 ASSAY OF PROGESTERONE: CPT | Performed by: NURSE PRACTITIONER

## 2023-06-01 PROCEDURE — 83002 ASSAY OF GONADOTROPIN (LH): CPT

## 2023-07-21 ENCOUNTER — OFFICE VISIT (OUTPATIENT)
Dept: ENDOCRINOLOGY | Facility: CLINIC | Age: 43
End: 2023-07-21
Payer: COMMERCIAL

## 2023-07-21 VITALS
OXYGEN SATURATION: 98 % | WEIGHT: 275 LBS | HEIGHT: 65 IN | BODY MASS INDEX: 45.82 KG/M2 | HEART RATE: 78 BPM | DIASTOLIC BLOOD PRESSURE: 80 MMHG | SYSTOLIC BLOOD PRESSURE: 120 MMHG

## 2023-07-21 DIAGNOSIS — R79.89 ELEVATED TESTOSTERONE LEVEL IN FEMALE: ICD-10-CM

## 2023-07-21 DIAGNOSIS — E66.01 CLASS 3 SEVERE OBESITY WITH BODY MASS INDEX (BMI) OF 45.0 TO 49.9 IN ADULT, UNSPECIFIED OBESITY TYPE, UNSPECIFIED WHETHER SERIOUS COMORBIDITY PRESENT: Chronic | ICD-10-CM

## 2023-07-21 DIAGNOSIS — E78.00 HYPERCHOLESTEROLEMIA: Chronic | ICD-10-CM

## 2023-07-21 DIAGNOSIS — E10.65 TYPE 1 DIABETES MELLITUS WITH HYPERGLYCEMIA: Primary | Chronic | ICD-10-CM

## 2023-07-21 LAB
EXPIRATION DATE: ABNORMAL
EXPIRATION DATE: NORMAL
GLUCOSE BLDC GLUCOMTR-MCNC: 132 MG/DL (ref 70–130)
HBA1C MFR BLD: 6.2 %
Lab: ABNORMAL
Lab: NORMAL

## 2023-07-21 PROCEDURE — 95251 CONT GLUC MNTR ANALYSIS I&R: CPT | Performed by: PHYSICIAN ASSISTANT

## 2023-07-21 PROCEDURE — 99214 OFFICE O/P EST MOD 30 MIN: CPT | Performed by: PHYSICIAN ASSISTANT

## 2023-07-21 PROCEDURE — 83036 HEMOGLOBIN GLYCOSYLATED A1C: CPT | Performed by: PHYSICIAN ASSISTANT

## 2023-07-21 NOTE — PROGRESS NOTES
Office Note      Date: 2023  Patient Name: Bridgett Barber  MRN: [id number]  : 1980    Chief Complaint   Patient presents with    Diabetes       History of Present Illness  Bridgett Barber is a 43 y.o. female who presents today for follow up on type 1 diabetes.   Diabetes was diagnosed in .  Known diabetic complications: none.  She remains on basal/bolus insulin injections.  She changed to the Dexcom G7 CGM.  She is monitoring glucose ~288 times per day using CGM.  She is frequently adjusting insulin based on glucose readings and carb counting.  She reports blood sugars improved.  She reports blood sugars are no longer rising throughout the night.  She has noted some nocturnal hypoglycemia.  No severe hypoglycemia.  Feet: No sores or other concerns.  Last eye exam:  - due. No retinopathy.  ACE inhibitor/ARB: No (discontinued lisinopril due to hypotension).  Statin: Atorvastatin.  She had labs on 2023.  Total cholesterol 144, LDL 81, triglycerides 76, HDL 47.  CMP WNL except for glucose of 173.  CBC normal.  She discontinued Saxenda as she was not losing weight.  She reports monthly cycles.  Flow is heavy, periods last around 8 days.  She saw gynecology and had labs last month.  Testosterone level was mildly elevated at 62.2 (ULN 48.1).  Progesterone 0.28.  LH 32.0, FSH 11.8.  She is scheduled for ultrasound.    Review of systems  As noted in HPI.    Past Medical History:   Diagnosis Date    Hypercholesterolemia     Hypertension     Obesity     Type 1 diabetes mellitus, uncontrolled       Past Surgical History:   Procedure Laterality Date    CHOLECYSTECTOMY      LAPAROSCOPIC TUBAL LIGATION      LASER ABLATION OF THE CERVIX      TONSILLECTOMY AND ADENOIDECTOMY       The following portions of the patient's history were reviewed and updated as appropriate: allergies, current medications, past family history, past medical history, past social history, past surgical history, and problem  "list.    Vitals:  /80 (BP Location: Left arm, Patient Position: Sitting, Cuff Size: Adult)   Pulse 78   Ht 165.1 cm (65\")   Wt 125 kg (275 lb)   SpO2 98%   BMI 45.76 kg/m²     Physical Exam  Vitals reviewed.   Constitutional:       General: She is not in acute distress.  Neurological:      Mental Status: She is alert and oriented to person, place, and time.   Psychiatric:         Mood and Affect: Affect normal.       Labs/Imaging    Hemoglobin A1c  Lab Results   Component Value Date    HGBA1C 6.2 07/21/2023    HGBA1C 5.7 04/04/2023    HGBA1C 6.3 03/15/2022     Glucose  Lab Results   Component Value Date    POCGLU 132 (A) 07/21/2023       Current Outpatient Medications   Medication Instructions    atorvastatin (LIPITOR) 20 mg, Oral, Daily    Continuous Blood Gluc Sensor (Dexcom G7 Sensor) misc 1 each, Does not apply, Every 10 Days    cycloSPORINE, PF, 0.09 % solution Ophthalmic    insulin aspart (NovoLOG FlexPen) 100 UNIT/ML solution pen-injector sc pen INJECT SUBCUTANEOUSLY 1    UNIT PER 3 GRAMS OF        CARBOHYDRATES  WITH MEALS  (MAXIMUM: 100 UNITS DAILY)    Tresiba FlexTouch 200 UNIT/ML solution pen-injector pen injection INJECT 80 UNITS SUBCUTANEOUSLY EVERY MORNING; TITRATE UP TO FASTING FINGERSTICK BLOOD SUGAR LESS THAN 120; (MAXIMUM DAILY DOSE: 100 UNITS)    Vortioxetine HBr (TRINTELLIX) 10 mg, Oral, Daily With Breakfast       Assessment & Plan  1. Type 1 diabetes mellitus with hyperglycemia  A1c at goal 6.2% today.  Reviewed Dexcom G7 CGM data and discussed with patient.  Sensor glucose average 130 for the past 2 weeks, 81% time in range , 5% low 54-69, <1% very low <54, 2% high >250.  This looks good overall, but fairly frequent hypoglycemia/oncoming hypoglycemia.  Some nocturnal and postprandial hypoglycemia.  Decrease Tresiba to 76 units daily.  Adjust Novolog carb ratio from 1:3 to 1:3.5, correction 1:15 over 130.  Discussed adjusting boluses up to 50% prior to physical activity/exercise " within 1.5 hours.  We discussed insulin pumps/automated insulin delivery systems.  Brochures provided today.  - POC Glucose, Blood  - POC Glycosylated Hemoglobin (Hb A1C)    2. Hypercholesterolemia  Lipids up to date.  Continue atorvastatin 20 mg daily.      3. Class 3 severe obesity with body mass index (BMI) of 45.0 to 49.9 in adult, unspecified obesity type, unspecified whether serious comorbidity present  She was not successful with weight loss while taking Saxenda.  Consider trying Wegovy when readily available.  We discussed referral to [facility] weight management clinic.  She will consider this option.  Recommend to use Lose It andriy or My Fitness Pal.  Encouraged nutritious dietary choices, moderation of carbohydrates, and regular exercise.     4. Elevated testosterone level in female  Mildly elevated total testosterone.  She will have results of ultrasound sent here.      Return in about 3 months (around 10/21/2023) for next scheduled follow up. She was advised to contact the office with any interval questions or concerns.    EHSAN Lisa  Endocrinology  07/21/2023

## 2023-08-01 DIAGNOSIS — E10.65 TYPE 1 DIABETES MELLITUS WITH HYPERGLYCEMIA: ICD-10-CM

## 2023-08-01 RX ORDER — PROCHLORPERAZINE 25 MG/1
SUPPOSITORY RECTAL
Qty: 9 EACH | Refills: 3 | Status: SHIPPED | OUTPATIENT
Start: 2023-08-01

## 2023-09-25 RX ORDER — INSULIN ASPART 100 [IU]/ML
INJECTION, SOLUTION INTRAVENOUS; SUBCUTANEOUS
Qty: 90 ML | Refills: 0 | Status: SHIPPED | OUTPATIENT
Start: 2023-09-25

## 2023-09-25 NOTE — TELEPHONE ENCOUNTER
Rx Refill Note  Requested Prescriptions     Pending Prescriptions Disp Refills    NovoLOG FlexPen 100 UNIT/ML solution pen-injector sc pen [Pharmacy Med Name: NOVOLOG F/P  PEN 100U/ML] 90 mL 0     Sig: INJECT SUBCUTANEOUSLY 1 UNIT PER 3 GRAMS OF CARBOHYDRATES  WITH MEALS (MAXIMUM: 100 UNITS DAILY)      Last office visit with prescribing clinician:7/21/23    Next office visit with prescribing clinician: 11/21/2023       Shanthi Sam MA  09/25/23, 08:19 EDT

## 2023-10-13 RX ORDER — INSULIN LISPRO 100 [IU]/ML
INJECTION, SOLUTION INTRAVENOUS; SUBCUTANEOUS
Qty: 90 ML | Refills: 0 | Status: SHIPPED | OUTPATIENT
Start: 2023-10-13

## 2023-10-13 NOTE — TELEPHONE ENCOUNTER
Rx Refill Note  Requested Prescriptions      No prescriptions requested or ordered in this encounter        Last office visit 7/21/23     Next office visit with prescribing clinician: 11/21/2023       Tammie Xiong (Jodi)  10/13/23, 09:32 EDT     Novolog is b/o at [facility].  Spoke to pt-Pended humalog

## 2023-10-13 NOTE — TELEPHONE ENCOUNTER
Rx Refill Note  Requested Prescriptions     Pending Prescriptions Disp Refills    insulin aspart (NovoLOG FlexPen) 100 UNIT/ML solution pen-injector sc pen 90 mL 0          Last office visit with prescribing clinician: Visit date not found     Next office visit with prescribing clinician: 11/21/2023         Karishma Rios MA  10/13/23, 13:20 EDT

## 2023-10-16 RX ORDER — INSULIN ASPART 100 [IU]/ML
INJECTION, SOLUTION INTRAVENOUS; SUBCUTANEOUS
Qty: 90 ML | Refills: 0 | Status: SHIPPED | OUTPATIENT
Start: 2023-10-16

## 2023-11-13 DIAGNOSIS — E10.65 TYPE 1 DIABETES MELLITUS WITH HYPERGLYCEMIA: Chronic | ICD-10-CM

## 2023-11-13 RX ORDER — INSULIN DEGLUDEC 200 U/ML
INJECTION, SOLUTION SUBCUTANEOUS
Qty: 45 ML | Refills: 3 | Status: SHIPPED | OUTPATIENT
Start: 2023-11-13

## 2023-11-13 NOTE — TELEPHONE ENCOUNTER
Rx Refill Note  Requested Prescriptions     Pending Prescriptions Disp Refills    Tresiba FlexTouch 200 UNIT/ML solution pen-injector pen injection [Pharmacy Med Name: TRESIBA FLEX PEN 200U/ML] 45 mL 1     Sig: INJECT 80 UNITS SUBCUTANEOUSLY EVERY MORNING; TITRATE UP TO FASTING FINGERSTICK BLOOD SUGAR LESS THAN 120; (MAXIMUM DAILY DOSE: 100 UNITS)    Dx Code:  E10.65  Last office visit with prescribing clinician: Visit date not found   Last telemedicine visit with prescribing clinician: Visit date not found   Next office visit with prescribing clinician: Visit date not found                         Would you like a call back once the refill request has been completed: [] Yes [] No    If the office needs to give you a call back, can they leave a voicemail: [] Yes [] No    Bettina Matta MA  11/13/23, 14:26 EST

## 2023-11-21 ENCOUNTER — OFFICE VISIT (OUTPATIENT)
Dept: ENDOCRINOLOGY | Facility: CLINIC | Age: 43
End: 2023-11-21
Payer: COMMERCIAL

## 2023-11-21 VITALS
SYSTOLIC BLOOD PRESSURE: 122 MMHG | HEIGHT: 65 IN | HEART RATE: 73 BPM | OXYGEN SATURATION: 97 % | WEIGHT: 270 LBS | DIASTOLIC BLOOD PRESSURE: 70 MMHG | BODY MASS INDEX: 44.98 KG/M2

## 2023-11-21 DIAGNOSIS — E66.01 CLASS 3 SEVERE OBESITY WITH BODY MASS INDEX (BMI) OF 40.0 TO 44.9 IN ADULT, UNSPECIFIED OBESITY TYPE, UNSPECIFIED WHETHER SERIOUS COMORBIDITY PRESENT: ICD-10-CM

## 2023-11-21 DIAGNOSIS — E10.649 TYPE 1 DIABETES MELLITUS WITH HYPOGLYCEMIA AND WITHOUT COMA: Primary | ICD-10-CM

## 2023-11-21 LAB
EXPIRATION DATE: ABNORMAL
EXPIRATION DATE: ABNORMAL
GLUCOSE BLDC GLUCOMTR-MCNC: 161 MG/DL (ref 70–130)
HBA1C MFR BLD: 6.1 % (ref 4.5–5.7)
Lab: ABNORMAL
Lab: ABNORMAL

## 2023-11-21 RX ORDER — LANCETS 33 GAUGE
EACH MISCELLANEOUS
Qty: 200 EACH | Refills: 3 | Status: SHIPPED | OUTPATIENT
Start: 2023-11-21 | End: 2023-11-22 | Stop reason: SDUPTHER

## 2023-11-21 RX ORDER — INSULIN ASPART 100 [IU]/ML
INJECTION, SOLUTION INTRAVENOUS; SUBCUTANEOUS
Qty: 90 ML | Refills: 2 | Status: SHIPPED | OUTPATIENT
Start: 2023-11-21

## 2023-11-21 NOTE — PROGRESS NOTES
"     Office Note      Date: 2023  Patient Name: Bridgett Barber  MRN: [id number]  : 1980    Chief Complaint   Patient presents with    Diabetes    Hypertension       History of Present Illness:   Bridgett Barber is a 43 y.o. female who presents for follow-up for type 1 diabetes diagnosed in .  She continues to use the Dexcom G7 continuous glucose monitor.  She remains on Tresiba 76 units daily and NovoLog 1 unit for every 3.5 g of carbohydrate plus an additional 1 unit for every 15 mg/dL her blood glucose is over 130 mg/dL.  She reports she does have some trouble with hypoglycemia overnight especially on the weekends.  She is frustrated that she has not lost more weight.  She reports she is due for her eye exam and will get this scheduled.  She denies any sores on her feet but does have some trouble with swelling.  Her primary care physician typically checks her blood work.  She was previously on lisinopril but this was stopped due to hypotension.      Subjective     Review of Systems:   Review of Systems   Constitutional: Negative.    Cardiovascular: Negative.    Gastrointestinal: Negative.    Endocrine: Negative.    Neurological: Negative.        The following portions of the patient's history were reviewed and updated as appropriate: allergies, current medications, past family history, past medical history, past social history, past surgical history, and problem list.    Objective     Vitals:    23 1002   BP: 122/70   BP Location: Left arm   Patient Position: Sitting   Cuff Size: Adult   Pulse: 73   SpO2: 97%   Weight: 122 kg (270 lb)   Height: 165.1 cm (65\")     Body mass index is 44.93 kg/m².    Physical Exam  Vitals reviewed.   Constitutional:       General: She is not in acute distress.     Appearance: Normal appearance.   Cardiovascular:      Pulses:           Dorsalis pedis pulses are 2+ on the right side and 2+ on the left side.        Posterior tibial pulses are 2+ on the right side and 2+ " on the left side.   Musculoskeletal:      Right foot: No deformity.      Left foot: No deformity.   Feet:      Right foot:      Protective Sensation: 5 sites tested.  5 sites sensed.      Skin integrity: Skin integrity normal. Dry skin present.      Toenail Condition: Right toenails are normal.      Left foot:      Protective Sensation: 5 sites tested.  5 sites sensed.      Skin integrity: Skin integrity normal. Dry skin present.      Toenail Condition: Left toenails are normal.      Comments: Diabetic Foot Exam Performed and Monofilament Test Performed    Trace edema bilaterally  Neurological:      Mental Status: She is alert.         HEMOGLOBIN A1C  Lab Results   Component Value Date    HGBA1C 6.1 (A) 11/21/2023       GLUCOSE  Glucose   Date Value Ref Range Status   11/21/2023 161 (A) 70 - 130 mg/dL Final             Assessment / Plan      Assessment & Plan:  1. Type 1 diabetes mellitus with hypoglycemia and without coma  Her hemoglobin A1c is excellent at 6.1%.  I reviewed her Dexcom continuous glucose monitor readings and overall her blood sugars look good.  She does have some hypoglycemia overnight.  We will reduce her Tresiba to 72 units daily.  She will continue her NovoLog 1 unit for every 3.5 g of carbohydrate with meals plus an additional 1 unit for every 50 mg/dL blood glucose is over 130 mg/dL.  Her weight is down 5 pounds since her appointment in July.  I congratulated her on her efforts.  Her blood pressure is okay today.  I encouraged her to schedule her eye exam.  - POC Glucose, Blood  - POC Glycosylated Hemoglobin (Hb A1C)    2. Class 3 severe obesity with body mass index (BMI) of 40.0 to 44.9 in adult, unspecified obesity type, unspecified whether serious comorbidity present  Her weight is down 5 pounds since her appointment in July.  I congratulated her on her efforts.  I encouraged continued healthy eating habits and physical activity as tolerated.  She has been on Saxenda in the past but this  did not work well for her.  We discussed the other injectable weight loss medications available but due to supply issues we will hold off on these for now.  We will discuss this further at her follow-up appointment.      Return in about 4 months (around 3/21/2024) for Recheck.     This note was dictated using Dragon voice recognition.    Martha Gandhi PA-C  11/21/2023

## 2023-11-22 RX ORDER — LANCETS 33 GAUGE
EACH MISCELLANEOUS
Qty: 400 EACH | Refills: 3 | Status: SHIPPED | OUTPATIENT
Start: 2023-11-22 | End: 2023-11-27 | Stop reason: SDUPTHER

## 2023-11-22 NOTE — TELEPHONE ENCOUNTER
Rx Refill Note  Requested Prescriptions      No prescriptions requested or ordered in this encounter      Last office visit with prescribing clinician: 11/21/2023   Last telemedicine visit with prescribing clinician: Visit date not found   Next office visit with prescribing clinician: 4/8/2024                         Would you like a call back once the refill request has been completed: [] Yes [] No    If the office needs to give you a call back, can they leave a voicemail: [] Yes [] No    Jorge Haq MA  11/22/23, 14:21 EST

## 2023-11-27 ENCOUNTER — TELEPHONE (OUTPATIENT)
Dept: ENDOCRINOLOGY | Facility: CLINIC | Age: 43
End: 2023-11-27
Payer: COMMERCIAL

## 2023-11-27 RX ORDER — LANCETS 33 GAUGE
EACH MISCELLANEOUS
Qty: 400 EACH | Refills: 3 | Status: SHIPPED | OUTPATIENT
Start: 2023-11-27

## 2024-01-02 RX ORDER — INSULIN ASPART 100 [IU]/ML
INJECTION, SOLUTION INTRAVENOUS; SUBCUTANEOUS
Qty: 90 ML | Refills: 2 | Status: SHIPPED | OUTPATIENT
Start: 2024-01-02

## 2024-01-02 RX ORDER — LANCETS 33 GAUGE
EACH MISCELLANEOUS
Qty: 400 EACH | Refills: 3 | Status: SHIPPED | OUTPATIENT
Start: 2024-01-02

## 2024-01-02 NOTE — TELEPHONE ENCOUNTER
Rx Refill Note  Requested Prescriptions     Pending Prescriptions Disp Refills    insulin aspart (NovoLOG FlexPen) 100 UNIT/ML solution pen-injector sc pen [Pharmacy Med Name: NOVOLOG F/P  PEN 100U/ML] 90 mL 2     Sig: INJECT 1 UNIT FOR EVERY 3 AND 1/2        GRAMS OF CARBOHYDRATE PLUS             CORRECTIONS; MAXIMUM               UNITS PER DAY    Insulin Pen Needle (Comfort EZ Pen Needles) 32G X 4 MM misc 400 each 3     Sig: Patient injects qid    Dx Code:  E10.65  Last office visit with prescribing clinician: 11/21/2023   Last telemedicine visit with prescribing clinician: Visit date not found   Next office visit with prescribing clinician: 4/8/2024                         Would you like a call back once the refill request has been completed: [] Yes [] No    If the office needs to give you a call back, can they leave a voicemail: [] Yes [] No    Bettina Matta MA  01/02/24, 11:05 EST

## 2024-04-11 ENCOUNTER — OFFICE VISIT (OUTPATIENT)
Dept: ENDOCRINOLOGY | Facility: CLINIC | Age: 44
End: 2024-04-11
Payer: COMMERCIAL

## 2024-04-11 ENCOUNTER — PATIENT MESSAGE (OUTPATIENT)
Dept: ENDOCRINOLOGY | Facility: CLINIC | Age: 44
End: 2024-04-11

## 2024-04-11 VITALS
HEIGHT: 65 IN | OXYGEN SATURATION: 97 % | BODY MASS INDEX: 44.98 KG/M2 | SYSTOLIC BLOOD PRESSURE: 120 MMHG | WEIGHT: 270 LBS | DIASTOLIC BLOOD PRESSURE: 77 MMHG | HEART RATE: 75 BPM

## 2024-04-11 DIAGNOSIS — E66.01 CLASS 3 SEVERE OBESITY WITH BODY MASS INDEX (BMI) OF 40.0 TO 44.9 IN ADULT, UNSPECIFIED OBESITY TYPE, UNSPECIFIED WHETHER SERIOUS COMORBIDITY PRESENT: ICD-10-CM

## 2024-04-11 DIAGNOSIS — E10.649 TYPE 1 DIABETES MELLITUS WITH HYPOGLYCEMIA AND WITHOUT COMA: Primary | ICD-10-CM

## 2024-04-11 LAB
EXPIRATION DATE: ABNORMAL
EXPIRATION DATE: ABNORMAL
GLUCOSE BLDC GLUCOMTR-MCNC: 164 MG/DL (ref 70–130)
HBA1C MFR BLD: 6.2 % (ref 4.5–5.7)
Lab: ABNORMAL
Lab: ABNORMAL

## 2024-04-11 RX ORDER — ACYCLOVIR 400 MG/1
TABLET ORAL
COMMUNITY

## 2024-04-11 NOTE — PROGRESS NOTES
"     Office Note      Date: 2024  Patient Name: Bridgett Barber  MRN: [id number]  : 1980    Chief Complaint   Patient presents with    Diabetes       History of Present Illness:   Bridgett Barber is a 43 y.o. female who presents for follow-up for type 1 diabetes diagnosed in .  She remains on Tresiba.  We reduced her dose to 72 units last appointment and since then her dose was further reduced due to hypoglycemia overnight to 68 units daily.  She reports this seems to have helped the low blood sugars.  She remains on NovoLog 1 unit for every 3.5 g of carbohydrate plus correction's.  She continues to use the Dexcom G7 continuous glucose monitor.  She reports recently she had a sensor in the area where she placed the sensor became red and irritated-the sensor was placed on her low back.  She reports she thinks this was a new sensor shipment.  She currently has her sensor on her arm and has not had any trouble.  She is up-to-date on her eye exam she had her appointment in January.  She denies any trouble with her feet today.  We did her foot exam at her appointment in November.  She reports she recently had fasting labs with her primary care physician and will send us a copy of these.    Subjective     Review of Systems:   Review of Systems   Constitutional: Negative.    Cardiovascular: Negative.    Gastrointestinal: Negative.    Endocrine: Negative.    Neurological: Negative.        The following portions of the patient's history were reviewed and updated as appropriate: allergies, current medications, past family history, past medical history, past social history, past surgical history, and problem list.    Objective     Vitals:    24 0923   BP: 120/77   Pulse: 75   SpO2: 97%   Weight: 122 kg (270 lb)   Height: 165.1 cm (65\")     Body mass index is 44.93 kg/m².    Physical Exam    HEMOGLOBIN A1C  Lab Results   Component Value Date    HGBA1C 6.2 (A) 2024       GLUCOSE  Glucose   Date Value Ref " Range Status   04/11/2024 164 (A) 70 - 130 mg/dL Final       CMP  Lab Results   Component Value Date    GLUCOSE 193 (H) 03/15/2022    BUN 13 03/15/2022    CREATININE 0.65 03/15/2022    BCR 20.0 03/15/2022    K 4.5 03/15/2022    CO2 26.1 03/15/2022    CALCIUM 9.7 03/15/2022    AST 15 03/15/2022    ALT 25 03/15/2022       LIPID PANEL  Lab Results   Component Value Date    CHOL 241 (H) 03/15/2022    TRIG 78 03/15/2022    HDL 47 03/15/2022     (H) 03/15/2022       URINE MICROALBUMIN/CREATININE RATIO  Microalbumin/Creatinine Ratio   Date Value Ref Range Status   03/15/2022   Final     Comment:     Unable to calculate       TSH  Lab Results   Component Value Date    TSH 3.310 03/15/2022       Assessment / Plan      Assessment & Plan:  1. Type 1 diabetes mellitus with hypoglycemia and without coma  Hemoglobin A1c is to goal at 6.2%.  I reviewed her Dexcom download and overall her blood sugars look better.  She has not had as many low blood sugars since we reduced her Tresiba.  Her blood sugars do tend to be elevated first thing in the morning but she gets these down quickly.  We did discuss considering an insulin pump in the future to see if this would help the hypoglycemia overnight.  I gave her handout on the tandem insulin pump as this would connect to her Dexcom G7.  For now she will continue Tresiba 68 units daily and NovoLog 1 unit for every 3.5 g of carbs.  We will try adding Zepbound for weight loss.  We discussed that when she starts this medication she will change her insulin to carb ratio to 1 unit for every 5 g of carbohydrate and bolus right after eating initially.  I sent a prescription for this to her pharmacy.  She was on Saxenda in the past but did not do well on this.  She this made her ill and she did not lose much weight.  Her blood pressure is okay today.  Her weight is stable.  We discussed using Flonase prior to putting on her Dexcom in the future if she continues to have issues with  sensitivity.  The skin on her lower back appeared to be healing and she did not appear to need a antibiotic at this time.  - POC Glucose, Blood  - POC Glycosylated Hemoglobin (Hb A1C)    2. Class 3 severe obesity with body mass index (BMI) of 40.0 to 44.9 in adult, unspecified obesity type, unspecified whether serious comorbidity present  We will plan to start Zepbound 2.5 mg weekly dose for weight loss.  She will reduce her insulin to the 1-5 carb ratio when she starts this medication.  We discussed that if she is tolerating this she will reach out to me and we will increase her to the 5 mg dose.  We discussed the possible side effects, how to use the injection device and insulin adjustment.  She will reach out to me to let me know where to send the prescription.  She would like to start with a 30-day supply.      Return in about 4 months (around 8/11/2024) for Recheck.     This note was dictated using Dragon voice recognition.    Electronically signed by: EHSAN Galindo  04/11/2024

## 2024-04-11 NOTE — PATIENT INSTRUCTIONS
Add Zepbound 2.5 mg weekly, change carb ratio to 1:5 when starting medication, bolus right after meals initially

## 2024-04-18 ENCOUNTER — PRIOR AUTHORIZATION (OUTPATIENT)
Dept: ENDOCRINOLOGY | Facility: CLINIC | Age: 44
End: 2024-04-18
Payer: COMMERCIAL

## 2024-04-18 NOTE — TELEPHONE ENCOUNTER
BEATRIZ MOLINA (Key: [id number])  PA Case ID #: [id number]  Rx #: [id number]  Need Help? Call us at [phone]  Outcome  Approved today  Your PA request has been approved. Additional information will be provided in the approval communication. (Message 1146)  Authorization Expiration Date: 12/17/2024  Drug  Zepbound 2.5MG/0.5ML pen-injectors  ePA cloud logo  Form  [facility] Electronic PA Form (2017 NCP[employer])

## 2024-05-13 RX ORDER — ACYCLOVIR 400 MG/1
1 TABLET ORAL
Qty: 9 EACH | Refills: 3 | Status: SHIPPED | OUTPATIENT
Start: 2024-05-13

## 2024-10-31 DIAGNOSIS — E10.65 TYPE 1 DIABETES MELLITUS WITH HYPERGLYCEMIA: Chronic | ICD-10-CM

## 2024-10-31 RX ORDER — INSULIN DEGLUDEC 200 U/ML
INJECTION, SOLUTION SUBCUTANEOUS
Qty: 45 ML | Refills: 0 | Status: SHIPPED | OUTPATIENT
Start: 2024-10-31

## 2024-10-31 NOTE — TELEPHONE ENCOUNTER
Rx Refill Note  Requested Prescriptions     Pending Prescriptions Disp Refills    Tresiba FlexTouch 200 UNIT/ML solution pen-injector pen injection [Pharmacy Med Name: TRESIBA FLEX PEN 200U/ML] 45 mL 3     Sig: INJECT 80 UNITS SUBCUTANEOUSLY EVERY MORNING; TITRATE UP TO FASTING FINGERSTICK BLOOD SUGAR LESS THAN 120; (MAXIMUM DAILY DOSE: 100 UNITS)      Last office visit with prescribing clinician: 04/11/2024     Next office visit with prescribing clinician: 11/26/2024                 Genia Josue MA  10/31/24, 10:08 EDT

## 2024-11-19 RX ORDER — INSULIN ASPART 100 [IU]/ML
INJECTION, SOLUTION INTRAVENOUS; SUBCUTANEOUS
Qty: 90 ML | Refills: 2 | Status: SHIPPED | OUTPATIENT
Start: 2024-11-19

## 2024-11-26 ENCOUNTER — OFFICE VISIT (OUTPATIENT)
Dept: ENDOCRINOLOGY | Facility: CLINIC | Age: 44
End: 2024-11-26
Payer: COMMERCIAL

## 2024-11-26 VITALS
SYSTOLIC BLOOD PRESSURE: 118 MMHG | OXYGEN SATURATION: 99 % | DIASTOLIC BLOOD PRESSURE: 75 MMHG | HEIGHT: 65 IN | HEART RATE: 72 BPM | BODY MASS INDEX: 44.65 KG/M2 | WEIGHT: 268 LBS

## 2024-11-26 DIAGNOSIS — E10.65 TYPE 1 DIABETES MELLITUS WITH HYPERGLYCEMIA: Primary | ICD-10-CM

## 2024-11-26 DIAGNOSIS — E66.813 CLASS 3 SEVERE OBESITY WITH BODY MASS INDEX (BMI) OF 40.0 TO 44.9 IN ADULT, UNSPECIFIED OBESITY TYPE, UNSPECIFIED WHETHER SERIOUS COMORBIDITY PRESENT: ICD-10-CM

## 2024-11-26 DIAGNOSIS — E66.01 CLASS 3 SEVERE OBESITY WITH BODY MASS INDEX (BMI) OF 40.0 TO 44.9 IN ADULT, UNSPECIFIED OBESITY TYPE, UNSPECIFIED WHETHER SERIOUS COMORBIDITY PRESENT: ICD-10-CM

## 2024-11-26 DIAGNOSIS — E78.00 HYPERCHOLESTEROLEMIA: Chronic | ICD-10-CM

## 2024-11-26 LAB
ALBUMIN SERPL-MCNC: 4 G/DL (ref 3.5–5.2)
ALBUMIN UR-MCNC: <1.2 MG/DL
ALBUMIN/GLOB SERPL: 1.6 G/DL
ALP SERPL-CCNC: 52 U/L (ref 39–117)
ALT SERPL W P-5'-P-CCNC: 16 U/L (ref 1–33)
ANION GAP SERPL CALCULATED.3IONS-SCNC: 9 MMOL/L (ref 5–15)
AST SERPL-CCNC: 13 U/L (ref 1–32)
BILIRUB SERPL-MCNC: 0.3 MG/DL (ref 0–1.2)
BUN SERPL-MCNC: 10 MG/DL (ref 6–20)
BUN/CREAT SERPL: 15.2 (ref 7–25)
CALCIUM SPEC-SCNC: 9 MG/DL (ref 8.6–10.5)
CHLORIDE SERPL-SCNC: 104 MMOL/L (ref 98–107)
CHOLEST SERPL-MCNC: 223 MG/DL (ref 0–200)
CO2 SERPL-SCNC: 25 MMOL/L (ref 22–29)
CREAT SERPL-MCNC: 0.66 MG/DL (ref 0.57–1)
CREAT UR-MCNC: 134.5 MG/DL
EGFRCR SERPLBLD CKD-EPI 2021: 110.4 ML/MIN/1.73
EXPIRATION DATE: ABNORMAL
EXPIRATION DATE: ABNORMAL
GLOBULIN UR ELPH-MCNC: 2.5 GM/DL
GLUCOSE BLDC GLUCOMTR-MCNC: 253 MG/DL (ref 70–130)
GLUCOSE SERPL-MCNC: 251 MG/DL (ref 65–99)
HBA1C MFR BLD: 6.6 % (ref 4.5–5.7)
HDLC SERPL-MCNC: 48 MG/DL (ref 40–60)
LDLC SERPL CALC-MCNC: 160 MG/DL (ref 0–100)
LDLC/HDLC SERPL: 3.3 {RATIO}
Lab: ABNORMAL
Lab: ABNORMAL
MICROALBUMIN/CREAT UR: NORMAL MG/G{CREAT}
POTASSIUM SERPL-SCNC: 4.6 MMOL/L (ref 3.5–5.2)
PROT SERPL-MCNC: 6.5 G/DL (ref 6–8.5)
SODIUM SERPL-SCNC: 138 MMOL/L (ref 136–145)
TRIGL SERPL-MCNC: 82 MG/DL (ref 0–150)
TSH SERPL DL<=0.05 MIU/L-ACNC: 2.82 UIU/ML (ref 0.27–4.2)
VLDLC SERPL-MCNC: 15 MG/DL (ref 5–40)

## 2024-11-26 PROCEDURE — 80061 LIPID PANEL: CPT | Performed by: PHYSICIAN ASSISTANT

## 2024-11-26 PROCEDURE — 80053 COMPREHEN METABOLIC PANEL: CPT | Performed by: PHYSICIAN ASSISTANT

## 2024-11-26 PROCEDURE — 82043 UR ALBUMIN QUANTITATIVE: CPT | Performed by: PHYSICIAN ASSISTANT

## 2024-11-26 PROCEDURE — 84443 ASSAY THYROID STIM HORMONE: CPT | Performed by: PHYSICIAN ASSISTANT

## 2024-11-26 PROCEDURE — 82570 ASSAY OF URINE CREATININE: CPT | Performed by: PHYSICIAN ASSISTANT

## 2024-11-26 RX ORDER — ATORVASTATIN CALCIUM 20 MG/1
20 TABLET, FILM COATED ORAL DAILY
Qty: 90 TABLET | Refills: 1 | Status: SHIPPED | OUTPATIENT
Start: 2024-11-26

## 2024-11-26 RX ORDER — INSULIN DEGLUDEC 200 U/ML
INJECTION, SOLUTION SUBCUTANEOUS
Qty: 45 ML | Refills: 2 | Status: SHIPPED | OUTPATIENT
Start: 2024-11-26

## 2024-11-26 NOTE — PROGRESS NOTES
"     Office Note      Date: 2024  Patient Name: Bridgett Barber  MRN: [id number]  : 1980    Chief Complaint   Patient presents with    Diabetes       History of Present Illness:   Bridgett Barber is a 44 y.o. female who presents for follow-up for type 1 diabetes diagnosed in .  She continues to use a Dexcom G7 continuous glucose monitor.  She is taking Tresiba 72 units daily and NovoLog 1 unit for every 3 g of carbohydrate with meals.  She reports she was not able to get the Zepbound due to supply issues.  She reports she is seeing a  and has been keeping an eye on her diet and writing down her food log.  She reports she feels her hemoglobin A1c should be better.  She does tend to have higher blood glucose readings in the mornings and has tried to increase her Tresiba but had trouble with low blood glucose readings.  She is up-to-date on her eye exam she will be due in January.  She denies any trouble with her feet today.  She is fasting for labs.  She reports she has not been taking her atorvastatin in a while.  She reports this upset her stomach when she was taking it with her Trintellix.  She is willing to try this again if needed.      Subjective     Review of Systems:   Review of Systems   Constitutional: Negative.    Cardiovascular: Negative.    Gastrointestinal: Negative.    Endocrine: Negative.    Neurological: Negative.        The following portions of the patient's history were reviewed and updated as appropriate: allergies, current medications, past family history, past medical history, past social history, past surgical history, and problem list.    Objective     Vitals:    24 0859   BP: 118/75   Pulse: 72   SpO2: 99%   Weight: 122 kg (268 lb)   Height: 165.1 cm (65\")     Body mass index is 44.6 kg/m².    Physical Exam  Vitals reviewed.   Constitutional:       General: She is not in acute distress.     Appearance: Normal appearance.   Cardiovascular:      Pulses:           " Dorsalis pedis pulses are 2+ on the right side and 2+ on the left side.        Posterior tibial pulses are 2+ on the right side and 2+ on the left side.   Musculoskeletal:      Right foot: No deformity.      Left foot: No deformity.   Feet:      Right foot:      Protective Sensation: 5 sites tested.  5 sites sensed.      Skin integrity: Skin integrity normal. Dry skin present.      Toenail Condition: Right toenails are normal.      Left foot:      Protective Sensation: 5 sites tested.  5 sites sensed.      Skin integrity: Skin integrity normal. Dry skin present.      Toenail Condition: Left toenails are normal.      Comments: Diabetic Foot Exam Performed and Monofilament Test Performed      Neurological:      Mental Status: She is alert.         HEMOGLOBIN A1C  Lab Results   Component Value Date    HGBA1C 6.6 (A) 11/26/2024       GLUCOSE  Glucose   Date Value Ref Range Status   11/26/2024 253 (A) 70 - 130 mg/dL Final           Assessment / Plan      Assessment & Plan:  1. Type 1 diabetes mellitus with hyperglycemia  Her hemoglobin A1c today is up some as compared to April at 6.6%.  This is still to goal.  I reviewed her Dexcom download.  Overall her blood sugars look good throughout the day but do tend to be higher in the mornings.  Her average glucose reading for the last 14 days is 171 mg/dL with 57% of her readings within range, 30% high, 12% very high, 1% low and less than 1% very low.  We will add a correction with her insulin dose at meals.  1 unit for every 50 over 130 mg/dL.  She was unable to get Zepbound due to supply issues though this was covered by her insurance as the prior authorization was approved.  We will try adding this again.  I sent this to her pharmacy.  We discussed that she will her carb ratio to 1 unit for every 5 g of carbohydrate once she is on the Zepbound.  Her weight is down 2 pounds since her appointment in April.  I encouraged healthy eating habits and physical activity as  tolerated.  Fasting labs pending today.  Will send note with results and plan.  - POC Glucose, Blood  - POC Glycosylated Hemoglobin (Hb A1C)  - TSH; Future  - Comprehensive Metabolic Panel; Future  - Microalbumin / Creatinine Urine Ratio - Urine, Clean Catch; Future  - Lipid Panel; Future  - Insulin Degludec (Tresiba FlexTouch) 200 UNIT/ML solution pen-injector pen injection; INJECT 80 UNITS SUBCUTANEOUSLY EVERY MORNING; TITRATE UP TO FASTING FINGERSTICK BLOOD SUGAR LESS THAN 120; (MAXIMUM DAILY DOSE: 100 UNITS)  Dispense: 45 mL; Refill: 2  - TSH  - Comprehensive Metabolic Panel  - Microalbumin / Creatinine Urine Ratio - Urine, Clean Catch  - Lipid Panel    2. Class 3 severe obesity with body mass index (BMI) of 40.0 to 44.9 in adult, unspecified obesity type, unspecified whether serious comorbidity present  Her weight is down 2 pounds since her appointment in April.  We will try adding Zepbound again now but supply issues are better.  She will reach out to me if she has any trouble on this dose.  We discussed titrating the dose every 4 weeks as tolerated.      No follow-ups on file.     Electronically signed by: EHSAN Galindo  11/26/2024

## 2024-11-26 NOTE — PATIENT INSTRUCTIONS
TRY ZEPBOUND 2.5 MG WEEKLY-- IF TOLERATING REACH OUT AND WE MAY INCREASE THE DOSE EVERY 4 WEEKS.  WHEN YOU ADD ZEPBOUND CHANGE CARB RATIO TO 1 UNITS FOR 5 GRAMS  ADD CORRECTION 1 UNIT FOR EVERY 50 MG/DL BLOOD GLUCOSE IS ABOVE 130 MG/DL-- BLOOD GLUCOSE MINUS 130 AND DIVIDE BY 50.

## 2024-11-27 ENCOUNTER — PATIENT MESSAGE (OUTPATIENT)
Dept: ENDOCRINOLOGY | Facility: CLINIC | Age: 44
End: 2024-11-27
Payer: COMMERCIAL

## 2024-12-17 RX ORDER — INSULIN ASPART 100 [IU]/ML
INJECTION, SOLUTION INTRAVENOUS; SUBCUTANEOUS
Qty: 90 ML | Refills: 1 | Status: SHIPPED | OUTPATIENT
Start: 2024-12-17

## 2024-12-17 NOTE — TELEPHONE ENCOUNTER
Rx Refill Note  Requested Prescriptions     Pending Prescriptions Disp Refills    insulin aspart (NovoLOG FlexPen) 100 UNIT/ML solution pen-injector sc pen 90 mL 1     Sig: INJECT 1 UNIT FOR EVERY 3 AND 1/2        GRAMS OF CARBOHYDRATE PLUS             CORRECTIONS; MAXIMUM               UNITS PER DAY      Last office visit with prescribing clinician: 11/26/2024   Last telemedicine visit with prescribing clinician: Visit date not found   Next office visit with prescribing clinician: 3/27/2025                         Would you like a call back once the refill request has been completed: [] Yes [] No    If the office needs to give you a call back, can they leave a voicemail: [] Yes [] No    Bettina Matta MA  12/17/24, 14:57 EST

## 2024-12-19 ENCOUNTER — PRIOR AUTHORIZATION (OUTPATIENT)
Dept: ENDOCRINOLOGY | Facility: CLINIC | Age: 44
End: 2024-12-19
Payer: COMMERCIAL

## 2024-12-20 NOTE — TELEPHONE ENCOUNTER
BEATRIZ MOLINA (Key: [id number])  PA Case ID #: [id number]  Need Help? Call us at [phone]  Outcome  Appro[facility] December 19 [facility]  Your PA request has been approved. Additional information will be provided in the approval communication. (Message 114)  Authorization Expiration Date: 8/18/2025  Drug  Zepbound 2.5MG/0.5ML pen-injectors  ePA cloud logo  Form  [employer] PA Form (2017 NCPDP)

## 2025-01-28 ENCOUNTER — PATIENT MESSAGE (OUTPATIENT)
Dept: ENDOCRINOLOGY | Facility: CLINIC | Age: 45
End: 2025-01-28
Payer: COMMERCIAL

## 2025-01-28 DIAGNOSIS — E10.65 TYPE 1 DIABETES MELLITUS WITH HYPERGLYCEMIA: ICD-10-CM

## 2025-01-28 RX ORDER — INSULIN DEGLUDEC 200 U/ML
INJECTION, SOLUTION SUBCUTANEOUS
Qty: 45 ML | Refills: 1 | Status: SHIPPED | OUTPATIENT
Start: 2025-01-28

## 2025-01-28 RX ORDER — INSULIN ASPART 100 [IU]/ML
INJECTION, SOLUTION INTRAVENOUS; SUBCUTANEOUS
Qty: 90 ML | Refills: 1 | Status: SHIPPED | OUTPATIENT
Start: 2025-01-28

## 2025-01-28 NOTE — TELEPHONE ENCOUNTER
Rx Refill Note  Requested Prescriptions     Pending Prescriptions Disp Refills    insulin aspart (NovoLOG FlexPen) 100 UNIT/ML solution pen-injector sc pen 90 mL 0     Sig: INJECT 1 UNIT FOR EVERY 3 AND 1/2        GRAMS OF CARBOHYDRATE PLUS             CORRECTIONS; MAXIMUM               UNITS PER DAY          Last office visit with prescribing clinician: 11/26/2024     Next office visit with prescribing clinician: 3/27/2025   }    Vel Castanon MA  01/28/25, 11:29 EST

## 2025-02-18 RX ORDER — ACYCLOVIR 400 MG/1
1 TABLET ORAL
Qty: 9 EACH | Refills: 0 | Status: SHIPPED | OUTPATIENT
Start: 2025-02-18

## 2025-02-18 NOTE — TELEPHONE ENCOUNTER
Rx Refill Note  Requested Prescriptions     Pending Prescriptions Disp Refills    Continuous Glucose Sensor (Dexcom G7 Sensor) misc 4 each 0     Sig: Use 1 each Every 10 (Ten) Days.    Tirzepatide-Weight Management (ZEPBOUND) 2.5 MG/0.5ML solution auto-injector 3 mL 0     Sig: Inject 0.5 mL under the skin into the appropriate area as directed 1 (One) Time Per Week.          Last office visit with prescribing clinician: 11/26/2024     Next office visit with prescribing clinician: 3/27/2025   }    Vel Castanon MA  02/18/25, 14:19 EST

## 2025-02-18 NOTE — TELEPHONE ENCOUNTER
Looks like she wanted the Zepbound RX printed-- so I mailed this to her- please make her aware, thanks RT

## 2025-02-19 ENCOUNTER — PRIOR AUTHORIZATION (OUTPATIENT)
Dept: ENDOCRINOLOGY | Facility: CLINIC | Age: 45
End: 2025-02-19
Payer: COMMERCIAL

## 2025-02-19 NOTE — TELEPHONE ENCOUNTER
BEATRIZ MOLINA (Key: [id number])  PA Case ID #: [id number]  Rx #: [id number]  Need Help? Call us at [phone]  Outcome  [facility]  Your PA request has been approved. Additional information will be provided in the approval communication. (Message 1148)  Authorization Expiration Date: 2/19/2026  Drug  Dexcom G7 Sensor  ePA cloud logo  Form  [employer] PA Form (2017 NCPDP)

## 2025-03-07 ENCOUNTER — PRIOR AUTHORIZATION (OUTPATIENT)
Dept: ENDOCRINOLOGY | Facility: CLINIC | Age: 45
End: 2025-03-07
Payer: COMMERCIAL

## 2025-03-07 NOTE — TELEPHONE ENCOUNTER
BEATRIZ MOLINA (Key: [id number])  PA Case ID #: [id number]  Rx #: [id number]  Need Help? Call us at [phone]  Outcome  [facility]  Your PA request has been approved. Additional information will be provided in the approval communication. (Message 1141)  Effective Date: 3/6/2025  Authorization Expiration Date: 11/6/2025  Drug  Zepbound 2.5MG/0.5ML pen-injectors  ePA cloud logo  Form  [employer] PA Form (2017 NCPDP)

## 2025-03-27 ENCOUNTER — OFFICE VISIT (OUTPATIENT)
Dept: ENDOCRINOLOGY | Facility: CLINIC | Age: 45
End: 2025-03-27
Payer: COMMERCIAL

## 2025-03-27 VITALS
DIASTOLIC BLOOD PRESSURE: 78 MMHG | OXYGEN SATURATION: 97 % | HEIGHT: 65 IN | WEIGHT: 272 LBS | BODY MASS INDEX: 45.32 KG/M2 | SYSTOLIC BLOOD PRESSURE: 128 MMHG | HEART RATE: 80 BPM

## 2025-03-27 DIAGNOSIS — E66.01 CLASS 3 SEVERE OBESITY WITH BODY MASS INDEX (BMI) OF 45.0 TO 49.9 IN ADULT, UNSPECIFIED OBESITY TYPE, UNSPECIFIED WHETHER SERIOUS COMORBIDITY PRESENT: ICD-10-CM

## 2025-03-27 DIAGNOSIS — E78.00 HYPERCHOLESTEROLEMIA: ICD-10-CM

## 2025-03-27 DIAGNOSIS — E66.813 CLASS 3 SEVERE OBESITY WITH BODY MASS INDEX (BMI) OF 45.0 TO 49.9 IN ADULT, UNSPECIFIED OBESITY TYPE, UNSPECIFIED WHETHER SERIOUS COMORBIDITY PRESENT: ICD-10-CM

## 2025-03-27 DIAGNOSIS — E10.65 TYPE 1 DIABETES MELLITUS WITH HYPERGLYCEMIA: Primary | ICD-10-CM

## 2025-03-27 LAB
EXPIRATION DATE: ABNORMAL
HBA1C MFR BLD: 6.6 % (ref 4.5–5.7)
Lab: ABNORMAL

## 2025-03-27 RX ORDER — ROSUVASTATIN CALCIUM 5 MG/1
5 TABLET, COATED ORAL NIGHTLY
Qty: 30 TABLET | Refills: 5 | Status: SHIPPED | OUTPATIENT
Start: 2025-03-27 | End: 2026-03-27

## 2025-03-27 NOTE — PROGRESS NOTES
"     Office Note      Date: 2025  Patient Name: Bridgett Barber  MRN: [id number]  : 1980    Chief Complaint   Patient presents with    Diabetes     Type 1 diabetes mellitus with hyperglycemia       History of Present Illness:   Bridgett Barber is a 44 y.o. female who presents for follow-up for type 1 diabetes diagnosed in .  She is taking Tresiba 72 units daily and NovoLog 1 unit for every 3 g of carbohydrate plus an additional 1 unit for every 50 mg/dL her glucose is above 130 mg/dL.  She continues to use a Dexcom G7 continuous glucose monitor.  She reports her blood sugars tend to spike in the mornings and do not settle down until later in the afternoons.  She reports the correction helps some but does not seem to be helping enough.  She reports she has had some low blood sugars overnight.  We tried adding Zepbound and this was approved by her insurance but she reports it was good to be over $700 for her cost.  She plans to reach out to the insurance to check on this.  She is up-to-date on her eye exam she had her appointment in January.  She denies any trouble with her feet today.  We did her foot exam last visit as well as fasting labs.  Her cholesterol numbers were elevated but she is not taking the atorvastatin regularly.      Subjective     Review of Systems:   Review of Systems   Constitutional: Negative.    Cardiovascular: Negative.    Gastrointestinal: Negative.    Endocrine: Negative.    Neurological: Negative.        The following portions of the patient's history were reviewed and updated as appropriate: allergies, current medications, past family history, past medical history, past social history, past surgical history, and problem list.    Objective     Vitals:    25 0958   BP: 128/78   BP Location: Left arm   Patient Position: Sitting   Cuff Size: Adult   Pulse: 80   SpO2: 97%   Weight: 123 kg (272 lb)   Height: 165.1 cm (65\")     Body mass index is 45.26 kg/m².    Physical Exam  Vitals " reviewed.   Constitutional:       General: She is not in acute distress.     Appearance: Normal appearance.   Neurological:      Mental Status: She is alert.         HEMOGLOBIN A1C  Lab Results   Component Value Date    HGBA1C 6.6 (A) 03/27/2025       GLUCOSE  Glucose   Date Value Ref Range Status   11/26/2024 253 (A) 70 - 130 mg/dL Final       CMP  Lab Results   Component Value Date    GLUCOSE 251 (H) 11/26/2024    BUN 10 11/26/2024    CREATININE 0.66 11/26/2024    BCR 15.2 11/26/2024    K 4.6 11/26/2024    CO2 25.0 11/26/2024    CALCIUM 9.0 11/26/2024    AST 13 11/26/2024    ALT 16 11/26/2024       LIPID PANEL  Lab Results   Component Value Date    CHOL 223 (H) 11/26/2024    TRIG 82 11/26/2024    HDL 48 11/26/2024     (H) 11/26/2024       URINE MICROALBUMIN/CREATININE RATIO  Microalbumin/Creatinine Ratio   Date Value Ref Range Status   11/26/2024   Final     Comment:     Unable to calculate       TSH  Lab Results   Component Value Date    TSH 2.820 11/26/2024       Assessment / Plan      Assessment & Plan:  1. Type 1 diabetes mellitus with hyperglycemia  Her hemoglobin A1c today is stable at 6.6%.  I reviewed her Dexcom download.  Her average glucose for the last 2 weeks is 172 mg/dL with 60% of her readings within range, 23% high, 16% very high, 1% low and less than 1% very low.  Her blood sugars do tend to spike in the mornings.  She does have some hypoglycemia overnight.  We will try reducing her Tresiba to 68 units daily and change her NovoLog correction factor to be more aggressive.  She will continue NovoLog 1 unit for every 3 grams of carbohydrate and change her correction to 1 unit for every 40 mg/dL her glucose is above 130 mg/dL.  Her blood pressure is okay today.  Her weight is up 4 pounds since her appointment in November.  I encouraged healthy eating habits and physical activity as tolerated.  - POC Glycosylated Hemoglobin (Hb A1C)    2. Hypercholesterolemia  Her cholesterol numbers were above  goal in November.  She reports she is not taking the atorvastatin.  We will try adding rosuvastatin 5 mg daily.  She is okay with considering this.  We will plan to recheck cholesterol and liver enzymes next visit for monitoring.  I sent a prescription for this to her pharmacy.    3. Class 3 severe obesity with body mass index (BMI) of 45.0 to 49.9 in adult, unspecified obesity type, unspecified whether serious comorbidity present  Her weight is up 4 pounds since her appointment in November.  We tried prescribing Zepbound but this is too costly even though approved.  She plans to reach out to her insurance to check on this.  I gave her the number for medical bariatrics through [facility] to see if they have any alternative medication she can consider for weight loss.      Return in about 4 months (around 7/27/2025) for Recheck.     This note was dictated using Dragon voice recognition.    Electronically signed by: EHSAN Galindo  03/27/2025

## 2025-05-08 ENCOUNTER — PATIENT MESSAGE (OUTPATIENT)
Dept: ENDOCRINOLOGY | Facility: CLINIC | Age: 45
End: 2025-05-08
Payer: COMMERCIAL

## 2025-05-19 RX ORDER — ACYCLOVIR 400 MG/1
TABLET ORAL
Qty: 9 EACH | Refills: 0 | Status: SHIPPED | OUTPATIENT
Start: 2025-05-19

## 2025-05-22 ENCOUNTER — PATIENT MESSAGE (OUTPATIENT)
Dept: ENDOCRINOLOGY | Facility: CLINIC | Age: 45
End: 2025-05-22
Payer: COMMERCIAL

## 2025-05-25 RX ORDER — SEMAGLUTIDE 0.25 MG/.5ML
0.25 INJECTION, SOLUTION SUBCUTANEOUS WEEKLY
Qty: 2 ML | Refills: 2 | Status: SHIPPED | OUTPATIENT
Start: 2025-05-25

## 2025-06-24 ENCOUNTER — PATIENT MESSAGE (OUTPATIENT)
Dept: ENDOCRINOLOGY | Facility: CLINIC | Age: 45
End: 2025-06-24
Payer: COMMERCIAL

## 2025-06-26 RX ORDER — SEMAGLUTIDE 0.5 MG/.5ML
0.5 INJECTION, SOLUTION SUBCUTANEOUS WEEKLY
Qty: 2 ML | Refills: 3 | Status: SHIPPED | OUTPATIENT
Start: 2025-06-26

## 2025-07-28 RX ORDER — ACYCLOVIR 400 MG/1
TABLET ORAL
Qty: 9 EACH | Refills: 0 | Status: SHIPPED | OUTPATIENT
Start: 2025-07-28

## 2025-07-28 RX ORDER — INSULIN ASPART 100 [IU]/ML
INJECTION, SOLUTION INTRAVENOUS; SUBCUTANEOUS
Qty: 90 ML | Refills: 1 | Status: SHIPPED | OUTPATIENT
Start: 2025-07-28

## 2025-08-14 ENCOUNTER — OFFICE VISIT (OUTPATIENT)
Dept: ENDOCRINOLOGY | Facility: CLINIC | Age: 45
End: 2025-08-14
Payer: COMMERCIAL

## 2025-08-14 VITALS
BODY MASS INDEX: 44.79 KG/M2 | SYSTOLIC BLOOD PRESSURE: 130 MMHG | OXYGEN SATURATION: 99 % | WEIGHT: 268.8 LBS | HEART RATE: 72 BPM | DIASTOLIC BLOOD PRESSURE: 84 MMHG | HEIGHT: 65 IN

## 2025-08-14 DIAGNOSIS — E66.813 CLASS 3 SEVERE OBESITY WITH BODY MASS INDEX (BMI) OF 40.0 TO 44.9 IN ADULT, UNSPECIFIED OBESITY TYPE, UNSPECIFIED WHETHER SERIOUS COMORBIDITY PRESENT: ICD-10-CM

## 2025-08-14 DIAGNOSIS — E10.65 TYPE 1 DIABETES MELLITUS WITH HYPERGLYCEMIA: Primary | ICD-10-CM

## 2025-08-14 LAB
EXPIRATION DATE: ABNORMAL
EXPIRATION DATE: ABNORMAL
GLUCOSE BLDC GLUCOMTR-MCNC: 141 MG/DL (ref 70–130)
HBA1C MFR BLD: 6.6 % (ref 4.5–5.7)
Lab: ABNORMAL
Lab: ABNORMAL

## 2025-08-14 RX ORDER — SEMAGLUTIDE 1 MG/.5ML
1 INJECTION, SOLUTION SUBCUTANEOUS WEEKLY
Qty: 2 ML | Refills: 4 | Status: SHIPPED | OUTPATIENT
Start: 2025-08-14

## 2025-08-14 RX ORDER — VENLAFAXINE HYDROCHLORIDE 37.5 MG/1
37.5 CAPSULE, EXTENDED RELEASE ORAL DAILY
COMMUNITY
Start: 2025-05-08

## 2025-08-14 RX ORDER — INSULIN DEGLUDEC 200 U/ML
INJECTION, SOLUTION SUBCUTANEOUS
Qty: 45 ML | Refills: 1 | Status: SHIPPED | OUTPATIENT
Start: 2025-08-14